# Patient Record
Sex: MALE | Employment: FULL TIME | URBAN - METROPOLITAN AREA
[De-identification: names, ages, dates, MRNs, and addresses within clinical notes are randomized per-mention and may not be internally consistent; named-entity substitution may affect disease eponyms.]

---

## 2023-02-04 PROBLEM — M54.50 CHRONIC LEFT-SIDED LOW BACK PAIN WITHOUT SCIATICA: Status: ACTIVE | Noted: 2023-02-04

## 2023-02-04 PROBLEM — G89.29 CHRONIC LEFT-SIDED LOW BACK PAIN WITHOUT SCIATICA: Status: ACTIVE | Noted: 2023-02-04

## 2023-02-14 ENCOUNTER — LAB ENCOUNTER (OUTPATIENT)
Dept: LAB | Age: 38
End: 2023-02-14
Attending: NURSE PRACTITIONER
Payer: COMMERCIAL

## 2023-02-14 DIAGNOSIS — R11.2 NAUSEA WITH VOMITING: ICD-10-CM

## 2023-02-14 DIAGNOSIS — R10.10 UPPER ABDOMINAL PAIN: Primary | ICD-10-CM

## 2023-02-14 PROCEDURE — 85025 COMPLETE CBC W/AUTO DIFF WBC: CPT | Performed by: NURSE PRACTITIONER

## 2023-02-14 PROCEDURE — 80053 COMPREHEN METABOLIC PANEL: CPT | Performed by: NURSE PRACTITIONER

## 2023-02-14 PROCEDURE — 36415 COLL VENOUS BLD VENIPUNCTURE: CPT | Performed by: NURSE PRACTITIONER

## 2023-02-14 PROCEDURE — 80061 LIPID PANEL: CPT | Performed by: NURSE PRACTITIONER

## 2023-02-14 PROCEDURE — 83013 H PYLORI (C-13) BREATH: CPT

## 2023-02-14 PROCEDURE — 83690 ASSAY OF LIPASE: CPT | Performed by: NURSE PRACTITIONER

## 2023-02-14 PROCEDURE — 84443 ASSAY THYROID STIM HORMONE: CPT | Performed by: NURSE PRACTITIONER

## 2023-02-15 LAB — H. PYLORI BREATH TEST: NEGATIVE

## 2023-02-22 ENCOUNTER — OFFICE VISIT (OUTPATIENT)
Dept: INTERNAL MEDICINE CLINIC | Facility: CLINIC | Age: 38
End: 2023-02-22
Payer: COMMERCIAL

## 2023-02-22 ENCOUNTER — TELEPHONE (OUTPATIENT)
Dept: INTERNAL MEDICINE CLINIC | Facility: CLINIC | Age: 38
End: 2023-02-22

## 2023-02-22 VITALS
OXYGEN SATURATION: 98 % | BODY MASS INDEX: 30.89 KG/M2 | SYSTOLIC BLOOD PRESSURE: 118 MMHG | HEIGHT: 69.5 IN | HEART RATE: 83 BPM | RESPIRATION RATE: 14 BRPM | TEMPERATURE: 99 F | DIASTOLIC BLOOD PRESSURE: 86 MMHG | WEIGHT: 213.38 LBS

## 2023-02-22 DIAGNOSIS — R11.2 NAUSEA AND VOMITING, UNSPECIFIED VOMITING TYPE: ICD-10-CM

## 2023-02-22 DIAGNOSIS — R19.7 DIARRHEA OF PRESUMED INFECTIOUS ORIGIN: ICD-10-CM

## 2023-02-22 DIAGNOSIS — R10.10 PAIN OF UPPER ABDOMEN: Primary | ICD-10-CM

## 2023-02-22 DIAGNOSIS — F41.8 SITUATIONAL ANXIETY: ICD-10-CM

## 2023-02-22 PROBLEM — F40.240 CLAUSTROPHOBIA: Status: ACTIVE | Noted: 2021-09-15

## 2023-02-22 PROBLEM — M51.36 LUMBAR DEGENERATIVE DISC DISEASE: Status: ACTIVE | Noted: 2021-10-12

## 2023-02-22 PROBLEM — M54.16 LUMBAR RADICULOPATHY, ACUTE: Status: ACTIVE | Noted: 2021-06-30

## 2023-02-22 PROCEDURE — 3074F SYST BP LT 130 MM HG: CPT | Performed by: NURSE PRACTITIONER

## 2023-02-22 PROCEDURE — 99214 OFFICE O/P EST MOD 30 MIN: CPT | Performed by: NURSE PRACTITIONER

## 2023-02-22 PROCEDURE — 3079F DIAST BP 80-89 MM HG: CPT | Performed by: NURSE PRACTITIONER

## 2023-02-22 PROCEDURE — 3008F BODY MASS INDEX DOCD: CPT | Performed by: NURSE PRACTITIONER

## 2023-02-22 RX ORDER — GABAPENTIN 300 MG/1
2 CAPSULE ORAL 3 TIMES DAILY
COMMUNITY
Start: 2021-10-15 | End: 2023-02-22 | Stop reason: ALTCHOICE

## 2023-02-22 RX ORDER — TRAMADOL HYDROCHLORIDE 50 MG/1
1 TABLET ORAL EVERY 6 HOURS PRN
COMMUNITY
Start: 2021-10-07 | End: 2023-02-22 | Stop reason: ALTCHOICE

## 2023-02-22 RX ORDER — LORAZEPAM 1 MG/1
1 TABLET ORAL 2 TIMES DAILY PRN
Qty: 2 TABLET | Refills: 0 | Status: SHIPPED | OUTPATIENT
Start: 2023-02-22

## 2023-02-22 NOTE — PATIENT INSTRUCTIONS
Get your stool sample sent in    Get your CT scan done    Make an apt with gastro doctor     Take the ativan 30 mins to an hour before your test. May repeat another tablet as needed. Monitor for side effects and effectiveness. Do not drive to your test.     Go to the ER for any emergent symptoms. If you cannot get there safely call 911.      Follow up in 1 month for your physical or sooner as needed

## 2023-02-22 NOTE — TELEPHONE ENCOUNTER
Pt stated his vomiting has gotten worse in the last 24 hours. Pt stated he has stop taking medication.  Please advise

## 2023-02-22 NOTE — TELEPHONE ENCOUNTER
Condition update:  Pt feels worse. Pt reports he started Omeprazole on Friday. Abdomen feels hard since Sunday. Started feeling nauseated all day Monday. Had an episode of diarrhea Tuesday, took Immodium. He vomited Tueday night after dinner. This morning he is  Afebrile,  Nauseated,  tolerating water and crackers only. Denies signs of dehydration. He did not take the Omeprazole this morning as he feels it is increasing his symptoms. Pt is requesting antinausea medication.

## 2023-02-22 NOTE — TELEPHONE ENCOUNTER
He needs to be evaluated. If he wants to be seen in office that is fine. If pain is severe or not tolerating water/food he should go to ER. Thanks.

## 2023-02-22 NOTE — TELEPHONE ENCOUNTER
Incoming (mail or fax):  fax  Received from:  [facility]  Documentation given to:  JOLIE STOUT [facility] records

## 2023-02-22 NOTE — TELEPHONE ENCOUNTER
Talked to pt and appt made for today     Future Appointments   Date Time Provider Abby An   2/22/2023  1:40 PM Alberto Mendoza, FLORA [facility] Kar Richard

## 2023-02-23 ENCOUNTER — TELEPHONE (OUTPATIENT)
Dept: INTERNAL MEDICINE CLINIC | Facility: CLINIC | Age: 38
End: 2023-02-23

## 2023-02-23 ENCOUNTER — LAB ENCOUNTER (OUTPATIENT)
Dept: LAB | Facility: HOSPITAL | Age: 38
End: 2023-02-23
Attending: NURSE PRACTITIONER
Payer: COMMERCIAL

## 2023-02-23 ENCOUNTER — HOSPITAL ENCOUNTER (OUTPATIENT)
Dept: CT IMAGING | Facility: HOSPITAL | Age: 38
Discharge: HOME | End: 2023-02-23
Attending: NURSE PRACTITIONER
Payer: COMMERCIAL

## 2023-02-23 DIAGNOSIS — R19.7 DIARRHEA, UNSPECIFIED TYPE: ICD-10-CM

## 2023-02-23 DIAGNOSIS — R19.7 DIARRHEA OF PRESUMED INFECTIOUS ORIGIN: Primary | ICD-10-CM

## 2023-02-23 DIAGNOSIS — R11.2 NAUSEA AND VOMITING, UNSPECIFIED VOMITING TYPE: Primary | ICD-10-CM

## 2023-02-23 DIAGNOSIS — R10.10 PAIN OF UPPER ABDOMEN: ICD-10-CM

## 2023-02-23 DIAGNOSIS — R10.819 ABDOMINAL TENDERNESS WITHOUT REBOUND TENDERNESS, UNSPECIFIED LOCATION: ICD-10-CM

## 2023-02-23 LAB
CRYPTOSP AG STL QL IA: NEGATIVE
G LAMBLIA AG STL QL IA: NEGATIVE

## 2023-02-23 PROCEDURE — 87329 GIARDIA AG IA: CPT

## 2023-02-23 PROCEDURE — 87045 FECES CULTURE AEROBIC BACT: CPT

## 2023-02-23 PROCEDURE — 74177 CT ABD & PELVIS W/CONTRAST: CPT | Performed by: NURSE PRACTITIONER

## 2023-02-23 PROCEDURE — 87272 CRYPTOSPORIDIUM AG IF: CPT

## 2023-02-23 PROCEDURE — 87046 STOOL CULTR AEROBIC BACT EA: CPT

## 2023-02-23 PROCEDURE — 87493 C DIFF AMPLIFIED PROBE: CPT

## 2023-02-23 PROCEDURE — 87427 SHIGA-LIKE TOXIN AG IA: CPT

## 2023-02-23 RX ORDER — FAMOTIDINE 20 MG/1
20 TABLET, FILM COATED ORAL 2 TIMES DAILY
Qty: 180 TABLET | Refills: 0 | Status: SHIPPED | OUTPATIENT
Start: 2023-02-23

## 2023-02-24 ENCOUNTER — TELEPHONE (OUTPATIENT)
Dept: INTERNAL MEDICINE CLINIC | Facility: CLINIC | Age: 38
End: 2023-02-24

## 2023-02-24 LAB — C DIFF TOX B STL QL: NEGATIVE

## 2023-03-01 ENCOUNTER — TELEPHONE (OUTPATIENT)
Dept: INTERNAL MEDICINE CLINIC | Facility: CLINIC | Age: 38
End: 2023-03-01

## 2023-03-01 NOTE — TELEPHONE ENCOUNTER
Care Everywhere Records Received    Updated Care Everywhere: yes    Date of Service: 3/1/23    From What Facility: [facility] - Dr Gracia Presser: gastroenterology    Type of Record: Consult    Document Placed:  Elizabeth's incoming folder

## 2023-03-08 ENCOUNTER — MED REC SCAN ONLY (OUTPATIENT)
Dept: INTERNAL MEDICINE CLINIC | Facility: CLINIC | Age: 38
End: 2023-03-08

## 2023-03-16 ENCOUNTER — TELEPHONE (OUTPATIENT)
Dept: INTERNAL MEDICINE CLINIC | Facility: CLINIC | Age: 38
End: 2023-03-16

## 2023-03-16 NOTE — TELEPHONE ENCOUNTER
Care Everywhere Records Received    Updated Care Everywhere: yes    Date of Service: 3/10/23    From What Facility: [facility]    Speciality: gastro    Type of Record: consult    Document Placed:marco newton

## 2023-03-17 PROBLEM — K29.50 MILD CHRONIC GASTRITIS: Status: ACTIVE | Noted: 2023-03-17

## 2023-03-17 PROBLEM — K63.5 COLON POLYPS: Status: ACTIVE | Noted: 2023-03-17

## 2023-04-03 ENCOUNTER — TELEPHONE (OUTPATIENT)
Dept: INTERNAL MEDICINE CLINIC | Facility: CLINIC | Age: 38
End: 2023-04-03

## 2023-04-03 RX ORDER — DIPHENOXYLATE HYDROCHLORIDE AND ATROPINE SULFATE 2.5; .025 MG/1; MG/1
2 TABLET ORAL 4 TIMES DAILY PRN
Qty: 30 TABLET | Refills: 0 | Status: SHIPPED | OUTPATIENT
Start: 2023-04-03 | End: 2023-04-14

## 2023-04-03 NOTE — TELEPHONE ENCOUNTER
Pt stated that he has a upper and lower endoscopy with Dr Jorden Gomez. Pt stated that Dr Jorden Gomez sent over a med list that he would like Elizabeth to take over (did not see that info anywhere) Pt is calling to get a refill for one of the meds. Pt is out of this med. Pt was advised that Gayla Mcint[place] will probably want an appt to be able to refill this med. Pt stated that he is out and wanted to see if Gayla Mc[place] could just refill it. Med is Dithemoxylate Atropine 2.5mg.    [facility] [address], [address] [facility]  [address], [phone], [phone]  Thank you

## 2023-04-04 NOTE — TELEPHONE ENCOUNTER
Patient aware prescription was filled.   He said he did not have time for a physical.  PSR told him he needed to schedule one in the next 1-2 months

## 2023-04-14 RX ORDER — DIPHENOXYLATE HYDROCHLORIDE AND ATROPINE SULFATE 2.5; .025 MG/1; MG/1
2 TABLET ORAL 4 TIMES DAILY PRN
Qty: 90 TABLET | Refills: 0 | Status: SHIPPED | OUTPATIENT
Start: 2023-04-14

## 2023-04-14 NOTE — TELEPHONE ENCOUNTER
Noted below. Please inform the patient that I did read the gastroenterology's note with the recommendation that is why I did not mind continuing it until I was able to see him. If I am prescribing it though instead of GI I would like him to come to the office to be evaluated so I know that it is working. The last time I saw him his symptoms were pretty intense-if I am not the prescriber it is my responsibility need to make sure he is doing better. I was going to do that at the physical so he does not have to come back twice. I refilled his medication so he will have plenty of time to make it into the office for his annual physical.  If he does not want to do his physical at this time and does not want to come in to see me for his medicine check then he may request this medication come from the gastroenterologist instead as an alternative if he would like. Thank you.

## 2023-04-14 NOTE — TELEPHONE ENCOUNTER
Pt called to follow up on rx. Pt states he really does not need a physical nor does he have time to come in for a physical. Pt states he followed up with gastro and they agreed that he needs this med.  Pt would like a new refill for more than 4 days

## 2023-04-14 NOTE — TELEPHONE ENCOUNTER
Pt called to request  Prescription refill for Lomotil.       Last OV relevant to medication:2/22/23  Last refill date:  4/3/23    #/refills: 30/0  When pt was asked to return for OV: May or June  Upcoming appt/reason: PE none scheduled  Was pt informed of any over due labs: none  Pt had OV with GI 3/1/23

## 2023-04-17 NOTE — TELEPHONE ENCOUNTER
Future Appointments   Date Time Provider Abby Nolan   6/17/2023  9:00 AM Meredith Mendoza, FLORA [facility] Britany Rodriguez

## 2023-05-17 RX ORDER — DIPHENOXYLATE HYDROCHLORIDE AND ATROPINE SULFATE 2.5; .025 MG/1; MG/1
2 TABLET ORAL 4 TIMES DAILY PRN
Qty: 180 TABLET | Refills: 0 | Status: SHIPPED | OUTPATIENT
Start: 2023-05-17

## 2023-05-17 NOTE — TELEPHONE ENCOUNTER
Last OV relevant to medication: 2/22/23  Last refill date: 4/14/23 #90/refills: 0  When pt was asked to return for OV: 3/22/23  Upcoming appt/reason:   Future Appointments   Date Time Provider Abby Nolan   6/17/2023  9:00 AM Bonnie Mednoza APRN [facility] N Jyothi Balderrama   Was pt informed of any over due labs: none

## 2023-05-17 NOTE — TELEPHONE ENCOUNTER
Is this medication prescribed by the [facility] Providers? yes    Did the patient contact the pharmacy directly?:  Yes said nothing happened    Is patient out of meds or supply very low?:  out    Medication Requested:  diphenoxylate-atropine    Dose:  2.5-0.025 mg    Is patient requesting a 30 or 90 day supply?:  30    Pharmacy name and phone # or location: [facility] Fer[facility] [address], [address] [place] [address] [address], [phone], [phone]     Is the patient due for an appointment?: yes  (if so, please schedule appt)    Additional Notes:  n/a    Please advise the patient refills take up to 72 business hours.

## 2023-06-17 ENCOUNTER — OFFICE VISIT (OUTPATIENT)
Dept: INTERNAL MEDICINE CLINIC | Facility: CLINIC | Age: 38
End: 2023-06-17
Payer: COMMERCIAL

## 2023-06-17 VITALS
SYSTOLIC BLOOD PRESSURE: 120 MMHG | WEIGHT: 202 LBS | BODY MASS INDEX: 29.25 KG/M2 | DIASTOLIC BLOOD PRESSURE: 80 MMHG | HEIGHT: 69.5 IN | OXYGEN SATURATION: 98 % | RESPIRATION RATE: 20 BRPM | TEMPERATURE: 98 F | HEART RATE: 103 BPM

## 2023-06-17 DIAGNOSIS — R19.7 DIARRHEA, UNSPECIFIED TYPE: ICD-10-CM

## 2023-06-17 DIAGNOSIS — Z13.29 SCREENING FOR THYROID DISORDER: ICD-10-CM

## 2023-06-17 DIAGNOSIS — R11.2 NAUSEA AND VOMITING, UNSPECIFIED VOMITING TYPE: ICD-10-CM

## 2023-06-17 DIAGNOSIS — K29.50 MILD CHRONIC GASTRITIS: ICD-10-CM

## 2023-06-17 DIAGNOSIS — Z13.220 SCREENING FOR CHOLESTEROL LEVEL: ICD-10-CM

## 2023-06-17 DIAGNOSIS — Z00.00 ENCOUNTER FOR ANNUAL PHYSICAL EXAM: Primary | ICD-10-CM

## 2023-06-17 PROCEDURE — 99395 PREV VISIT EST AGE 18-39: CPT | Performed by: NURSE PRACTITIONER

## 2023-06-17 PROCEDURE — 99214 OFFICE O/P EST MOD 30 MIN: CPT | Performed by: NURSE PRACTITIONER

## 2023-06-17 PROCEDURE — 3074F SYST BP LT 130 MM HG: CPT | Performed by: NURSE PRACTITIONER

## 2023-06-17 PROCEDURE — 3079F DIAST BP 80-89 MM HG: CPT | Performed by: NURSE PRACTITIONER

## 2023-06-17 PROCEDURE — 3008F BODY MASS INDEX DOCD: CPT | Performed by: NURSE PRACTITIONER

## 2023-06-17 RX ORDER — DIPHENOXYLATE HYDROCHLORIDE AND ATROPINE SULFATE 2.5; .025 MG/1; MG/1
2 TABLET ORAL 4 TIMES DAILY PRN
Qty: 180 TABLET | Refills: 0 | Status: SHIPPED | OUTPATIENT
Start: 2023-06-17

## 2023-06-17 RX ORDER — PANTOPRAZOLE SODIUM 40 MG/1
40 TABLET, DELAYED RELEASE ORAL DAILY PRN
Qty: 90 TABLET | Refills: 0 | Status: SHIPPED | OUTPATIENT
Start: 2023-06-17

## 2023-06-17 RX ORDER — DICYCLOMINE HCL 20 MG
20 TABLET ORAL
Qty: 90 TABLET | Refills: 1 | Status: SHIPPED | OUTPATIENT
Start: 2023-06-17

## 2023-06-17 NOTE — PATIENT INSTRUCTIONS
COVID recommended     Continue 2 bentyl in the morning- 40 mg is the maximum at one time. Continue the other medications    Get your labs done in February when due. You have to call for orders at that time because [facility] orders  after 6 months. You should be fasting for at least 10 hours. If you take a multivitamin with Biotin or any biotin product it should be held for 3 days prior to getting your labs done.      Follow up in 1 year or sooner as needed

## 2023-08-30 DIAGNOSIS — R11.2 NAUSEA AND VOMITING, UNSPECIFIED VOMITING TYPE: ICD-10-CM

## 2023-08-30 DIAGNOSIS — R19.7 DIARRHEA, UNSPECIFIED TYPE: ICD-10-CM

## 2023-08-30 RX ORDER — DICYCLOMINE HCL 20 MG
20 TABLET ORAL
Qty: 90 TABLET | Refills: 1 | Status: SHIPPED | OUTPATIENT
Start: 2023-08-30

## 2023-08-31 RX ORDER — DIPHENOXYLATE HYDROCHLORIDE AND ATROPINE SULFATE 2.5; .025 MG/1; MG/1
2 TABLET ORAL 4 TIMES DAILY PRN
Qty: 180 TABLET | Refills: 0 | Status: SHIPPED | OUTPATIENT
Start: 2023-08-31

## 2023-10-04 ENCOUNTER — TELEPHONE (OUTPATIENT)
Dept: INTERNAL MEDICINE CLINIC | Facility: CLINIC | Age: 38
End: 2023-10-04

## 2023-10-04 DIAGNOSIS — R19.7 DIARRHEA, UNSPECIFIED TYPE: ICD-10-CM

## 2023-10-04 DIAGNOSIS — R11.2 NAUSEA AND VOMITING, UNSPECIFIED VOMITING TYPE: ICD-10-CM

## 2023-10-04 NOTE — TELEPHONE ENCOUNTER
Patient called regarding a refill for dicyclomine 20 MG Oral Tab. [facility] [address] [facility], [address] [place] [address] Road, [phone], [phone] [[id number]]  is telling him there are no refills for this medication. Our office sent a 90 day supply with one refill to this [facility] on 8/30/23. Patient is quite frustrated and has been without the medication for 4 days. Would someone be able to call the pharmacy to see what's going on? Please advise. Thank you!

## 2023-10-04 NOTE — TELEPHONE ENCOUNTER
Spoke to eron, they do have refill on file and will have ready for pt today. Unsure why they told pt no refills. Spoke to pt, he understands this will be ready for  today. He may switch pharmacies in future , advised to call our office when running low as this is his last refill and we will send to desired pharmacy when needed and pt verbalized understanding.

## 2023-10-17 DIAGNOSIS — R11.2 NAUSEA AND VOMITING, UNSPECIFIED VOMITING TYPE: ICD-10-CM

## 2023-10-17 DIAGNOSIS — R19.7 DIARRHEA, UNSPECIFIED TYPE: ICD-10-CM

## 2023-10-17 RX ORDER — DIPHENOXYLATE HYDROCHLORIDE AND ATROPINE SULFATE 2.5; .025 MG/1; MG/1
2 TABLET ORAL 4 TIMES DAILY PRN
Qty: 180 TABLET | Refills: 1 | Status: SHIPPED | OUTPATIENT
Start: 2023-10-17

## 2023-10-17 RX ORDER — DICYCLOMINE HCL 20 MG
20 TABLET ORAL
Qty: 90 TABLET | Refills: 1 | Status: SHIPPED | OUTPATIENT
Start: 2023-10-17

## 2023-10-17 NOTE — TELEPHONE ENCOUNTER
Last OV relevant to medication: 6/17/23  Last refill date: 8/30/23 #90/refills: 1  When pt was asked to return for OV: 6/17/24  Upcoming appt/reason: No future appointments.   Was pt informed of any over due labs: amos

## 2023-10-17 NOTE — TELEPHONE ENCOUNTER
Is this medication prescribed by the [facility] Providers? yes    Did the patient contact the pharmacy directly?:  yes - pharmacy said office is not responding to faxes    Is patient out of meds or supply very low?:  out for 4 days    Medication Requested:  dicyclomine Oral Tab     Dose:  20 MG    Is patient requesting a 30 or 90 day supply?:  90    Pharmacy name and phone # or location:  [facility] [address] [place] [address] Road    Is the patient due for an appointment?: no  (if so, please schedule appt)    Additional Notes:  Patient talked at length to someone in the pharmacy about refills. The pharmacy told him they fax refills and will only refill a prescription if a refill shows in their records. Please advise the patient refills take up to 72 business hours.

## 2023-10-17 NOTE — TELEPHONE ENCOUNTER
Last OV relevant to medication: 6/17/23  Last refill date: 8/31/23 #180/refills: 0  When pt was asked to return for OV: 6/17/24  Upcoming appt/reason: No future appointments.   Was pt informed of any over due labs: amos

## 2024-01-09 DIAGNOSIS — R19.7 DIARRHEA, UNSPECIFIED TYPE: ICD-10-CM

## 2024-01-09 DIAGNOSIS — R11.2 NAUSEA AND VOMITING, UNSPECIFIED VOMITING TYPE: ICD-10-CM

## 2024-01-09 RX ORDER — DICYCLOMINE HCL 20 MG
20 TABLET ORAL
Qty: 90 TABLET | Refills: 1 | Status: SHIPPED | OUTPATIENT
Start: 2024-01-09

## 2024-01-09 NOTE — TELEPHONE ENCOUNTER
Last OV relevant to medication: 6/17/23  Last refill date: 10/17/23 #90/refills: 1  When pt was asked to return for OV: 6/17/24  Upcoming appt/reason: No future appointments.  Was pt informed of any over due labs: amos

## 2024-01-09 NOTE — TELEPHONE ENCOUNTER
diphenoxylate-atropine 2.5-0.025 MG Oral Tab     Did the patient contact the pharmacy directly?:  yes    Is patient out of meds or supply very low?:  low    Medication Requested:  dicyclomine 20 MG Oral Ta     diphenoxylate-atropine 2.5-0.025 MG Oral Tab     Dose:      Is patient requesting a 30 or 90 day supply?:  90 and 180    Pharmacy name and phone # or location:  [facility][id number] - [place], [place] - [address] [facility], [phone], [phone] [address] [facility] [address] Phone: [phone] Fax: [phone]     Is the patient due for an appointment?: no  (if so, please schedule appt)    Additional Notes:      Please advise the patient refills take up to 72 business hours.

## 2024-01-16 DIAGNOSIS — R19.7 DIARRHEA, UNSPECIFIED TYPE: Primary | ICD-10-CM

## 2024-01-16 RX ORDER — DIPHENOXYLATE HYDROCHLORIDE AND ATROPINE SULFATE 2.5; .025 MG/1; MG/1
2 TABLET ORAL 4 TIMES DAILY PRN
Qty: 180 TABLET | Refills: 2 | Status: SHIPPED | OUTPATIENT
Start: 2024-01-16

## 2024-01-16 NOTE — TELEPHONE ENCOUNTER
Pharmacy told patient they never received the prescription for diphenoxylate-atropine 2.5-0.025 MG Oral Tab that was sent on 10/17/23.  Patient has been out of medication for weeks and said he was sick from not taking the med.  Please resend.  Thank you!

## 2024-01-16 NOTE — TELEPHONE ENCOUNTER
Spoke to pharmacist, he picked up 30 day supply 10/18/23 and refill 12/10/23. Out of refills, have not been notified until today. Refill pended.    Last OV relevant to medication: 6/17/23  Last refill date: 10/17/23 #180/refills: 1  When pt was asked to return for OV: 6/17/24  Upcoming appt/reason: No future appointments.  Was pt informed of any over due labs: utd

## 2024-04-09 DIAGNOSIS — R11.2 NAUSEA AND VOMITING, UNSPECIFIED VOMITING TYPE: ICD-10-CM

## 2024-04-09 DIAGNOSIS — R19.7 DIARRHEA, UNSPECIFIED TYPE: ICD-10-CM

## 2024-04-09 RX ORDER — DICYCLOMINE HCL 20 MG
20 TABLET ORAL
Qty: 90 TABLET | Refills: 1 | Status: SHIPPED | OUTPATIENT
Start: 2024-04-09

## 2024-04-09 RX ORDER — DIPHENOXYLATE HYDROCHLORIDE AND ATROPINE SULFATE 2.5; .025 MG/1; MG/1
2 TABLET ORAL 4 TIMES DAILY PRN
Qty: 180 TABLET | Refills: 2 | Status: SHIPPED | OUTPATIENT
Start: 2024-04-09

## 2024-04-09 NOTE — TELEPHONE ENCOUNTER
Is this medication prescribed by the [facility] Providers? yes    Did the patient contact the pharmacy directly?:  yes - pharmacy said no refills    Is patient out of meds or supply very low?:  out    Medication Requested:  diphenoxylate-atropine 2.5-0.025 MG Oral Tab      dicyclomine 20 MG Oral Tab     Is patient requesting a 30 or 90 day supply?:  90    Pharmacy name and phone # or location:  [facility] #[id number] [address] & [facility], [phone], [phone] [[id number]]     Is the patient due for an appointment?: due for physical in June  (if so, please schedule appt)    Additional Notes:  none    Please advise the patient refills take up to 72 business hours.

## 2024-04-09 NOTE — TELEPHONE ENCOUNTER
Last OV relevant to medication: 6/17/23  Last refill date: 1/16/24 #180/2- prn med that's used 2 tabs qid   1/9/24 #90/1-prn med that's used tid  When pt was asked to return for OV: 6/17/24  Upcoming appt/reason: No future appointments.  Was pt informed of any over due labs: utd

## 2024-06-12 ENCOUNTER — OFFICE VISIT (OUTPATIENT)
Dept: INTERNAL MEDICINE CLINIC | Facility: CLINIC | Age: 39
End: 2024-06-12
Payer: COMMERCIAL

## 2024-06-12 VITALS
SYSTOLIC BLOOD PRESSURE: 126 MMHG | HEART RATE: 64 BPM | OXYGEN SATURATION: 99 % | TEMPERATURE: 98 F | BODY MASS INDEX: 27.41 KG/M2 | HEIGHT: 69.29 IN | WEIGHT: 187.19 LBS | RESPIRATION RATE: 16 BRPM | DIASTOLIC BLOOD PRESSURE: 84 MMHG

## 2024-06-12 DIAGNOSIS — R19.7 DIARRHEA, UNSPECIFIED TYPE: ICD-10-CM

## 2024-06-12 DIAGNOSIS — Z13.29 SCREENING FOR THYROID DISORDER: ICD-10-CM

## 2024-06-12 DIAGNOSIS — Z13.220 SCREENING FOR CHOLESTEROL LEVEL: ICD-10-CM

## 2024-06-12 DIAGNOSIS — R11.2 NAUSEA AND VOMITING, UNSPECIFIED VOMITING TYPE: Primary | ICD-10-CM

## 2024-06-12 DIAGNOSIS — F41.9 ANXIETY: ICD-10-CM

## 2024-06-12 DIAGNOSIS — Z00.00 PHYSICAL EXAM: ICD-10-CM

## 2024-06-12 PROCEDURE — 99215 OFFICE O/P EST HI 40 MIN: CPT | Performed by: NURSE PRACTITIONER

## 2024-06-12 PROCEDURE — 3079F DIAST BP 80-89 MM HG: CPT | Performed by: NURSE PRACTITIONER

## 2024-06-12 PROCEDURE — 3008F BODY MASS INDEX DOCD: CPT | Performed by: NURSE PRACTITIONER

## 2024-06-12 PROCEDURE — 3074F SYST BP LT 130 MM HG: CPT | Performed by: NURSE PRACTITIONER

## 2024-06-12 RX ORDER — DICYCLOMINE HCL 20 MG
20 TABLET ORAL
Qty: 90 TABLET | Refills: 1 | Status: SHIPPED | OUTPATIENT
Start: 2024-06-12

## 2024-06-12 RX ORDER — BUSPIRONE HYDROCHLORIDE 5 MG/1
5 TABLET ORAL 2 TIMES DAILY
Qty: 60 TABLET | Refills: 0 | Status: SHIPPED | OUTPATIENT
Start: 2024-06-12

## 2024-06-12 RX ORDER — DIPHENOXYLATE HYDROCHLORIDE AND ATROPINE SULFATE 2.5; .025 MG/1; MG/1
2 TABLET ORAL 4 TIMES DAILY PRN
Qty: 180 TABLET | Refills: 2 | Status: SHIPPED | OUTPATIENT
Start: 2024-06-12

## 2024-06-12 NOTE — PATIENT INSTRUCTIONS
Get the MRI of the brain done    Continue your other current medication    Start buspirone 5 mg twice daily for anxiety and monitor your side effects, effectiveness, and for allergy.    After 2 weeks if your anxiety is still not well-controlled but you have no side effects you may increase the dose to 10 mg twice daily.    Get your labs done. You should be fasting for at least 10 hours. If you take a multivitamin with Biotin or any biotin product it should be held for 3 days prior to getting your labs done.     Follow-up in 2 to 4 weeks for a medicine check and physical or sooner as needed

## 2024-06-12 NOTE — PROGRESS NOTES
Rodrigue Byrne is a 39 year old male.  CHIEF COMPLAINT   Multiple issues    HPI:     The patient has a history of  nausea, vomiting, diarrhea and has had multiple workups with gastroenterology.  He reports that his GI symptoms have worsened in the last few months.  Additionally if he does not take the Lomotil he has worse mood swings and abdominal symptoms.  He has started vomiting more.    Anxiety-not controlled.  Is interested in medication at this time.  No panic attacks but high level of anxiety and mood swings.  No depression, SI or HI.    Current Outpatient Medications   Medication Sig Dispense Refill    diphenoxylate-atropine 2.5-0.025 MG Oral Tab Take 2 tablets by mouth 4 (four) times daily as needed for Diarrhea. 180 tablet 2    dicyclomine 20 MG Oral Tab Take 1 tablet (20 mg total) by mouth 3 (three) times daily before meals as needed. 90 tablet 1      History reviewed. No pertinent past medical history.   Social History:  Social History     Socioeconomic History    Marital status: Single   Tobacco Use    Smoking status: Never    Smokeless tobacco: Never   Vaping Use    Vaping status: Never Used   Substance and Sexual Activity    Alcohol use: Yes     Comment: Occ    Drug use: Never    Sexual activity: Yes     Partners: Female   Other Topics Concern    Caffeine Concern No    Exercise Yes    Seat Belt Yes    Special Diet No    Stress Concern No    Weight Concern No        REVIEW OF SYSTEMS:   See HPI     EXAM:     /84 (BP Location: Left arm, Patient Position: Sitting, Cuff Size: adult)   Pulse 64   Temp 97.9 °F (36.6 °C) (Temporal)   Resp 16   Ht 5' 9.29\" (1.76 m)   Wt 187 lb 3.2 oz (84.9 kg)   SpO2 99%   BMI 27.41 kg/m²   Body mass index is 27.41 kg/m².   GENERAL: well developed, well nourished,in no apparent distress  HEENT: atraumatic, normocephalic  LUNGS: clear to auscultation; no rhonchi, rales, or wheezing  CARDIO: RRR without murmur  GI: Mid upper abdominal tenderness, BS  active  MUSCULOSKELETAL: Proximal normal gait.  EXTREMITIES: no edema  NEURO: Oriented times three, Cosmos-Hallpike negative  PSYCH: GAD7= 19, no depression, no SI or HI    LABS:      Lab Results   Component Value Date    WBC 5.4 02/14/2023    RBC 4.71 02/14/2023    HGB 14.6 02/14/2023    HCT 44.2 02/14/2023    MCV 93.8 02/14/2023    MCH 31.0 02/14/2023    MCHC 33.0 02/14/2023    RDW 12.4 02/14/2023    .0 02/14/2023      Lab Results   Component Value Date     (H) 02/14/2023    BUN 14 02/14/2023    CREATSERUM 0.92 02/14/2023    ANIONGAP 0 02/14/2023    CA 9.2 02/14/2023    OSMOCALC 283 02/14/2023    ALKPHO 68 02/14/2023    AST 13 (L) 02/14/2023    ALT 28 02/14/2023    BILT 0.6 02/14/2023    TP 7.4 02/14/2023    ALB 4.1 02/14/2023    GLOBULIN 3.3 02/14/2023     02/14/2023    K 4.0 02/14/2023     02/14/2023    CO2 29.0 02/14/2023      Lab Results   Component Value Date    CHOLEST 164 02/14/2023    TRIG 86 02/14/2023    HDL 72 (H) 02/14/2023    LDL 76 02/14/2023    VLDL 13 02/14/2023    NONHDLC 92 02/14/2023      Lab Results   Component Value Date    TSH 1.370 02/14/2023       ASSESSMENT AND PLAN:   1. Nausea and vomiting, unspecified vomiting type  2. Diarrhea, unspecified type  -Ongoing nausea, vomiting, diarrhea.  Is worsening.  -Continue current medication  -Get MRI of the brain to check for any cerebrovascular etiologies.  -Micaela-Hallpike was negative on exam and does not complain of vertigo symptoms such as spinning  -Go to the emergency room as needed for any emergent symptoms  -See gastroenterology for further follow-up  - MRI BRAIN (CPT=70551); Future  - diphenoxylate-atropine 2.5-0.025 MG Oral Tab; Take 2 tablets by mouth 4 (four) times daily as needed for Diarrhea.  Dispense: 180 tablet; Refill: 2  - dicyclomine 20 MG Oral Tab; Take 1 tablet (20 mg total) by mouth 3 (three) times daily before meals as needed.  Dispense: 90 tablet; Refill: 1    3. Anxiety  -Ongoing anxiety but is  increasing  -Start buspirone.  Monitor closely for side effects, effectiveness, allergy  - busPIRone 5 MG Oral Tab; Take 1 tablet (5 mg total) by mouth in the morning and 1 tablet (5 mg total) before bedtime.  Dispense: 60 tablet; Refill: 0    4. Physical exam  - CBC With Differential With Platelet; Future  - Comp Metabolic Panel (14); Future    5. Screening for cholesterol level  - Lipid Panel; Future    6. Screening for thyroid disorder  - TSH W Reflex To Free T4; Future     The patient indicates understanding of these issues and agrees to the plan.  The patient is asked to return in 2 to 4 weeks for a medicine check or sooner as needed.    Total face to face time was 40, more than 50% of the time was spent in counseling and/or coordination of care related to the above diagnosis .

## 2024-07-07 DIAGNOSIS — F41.9 ANXIETY: ICD-10-CM

## 2024-07-08 RX ORDER — BUSPIRONE HYDROCHLORIDE 5 MG/1
TABLET ORAL
Qty: 60 TABLET | Refills: 0 | Status: SHIPPED | OUTPATIENT
Start: 2024-07-08

## 2024-07-08 NOTE — TELEPHONE ENCOUNTER
Last OV relevant to medication: 6/12/24  Last refill date: started 6/12       When pt was asked to return for OV: 2-4 weeks  Upcoming appt/reason: anxiety  Was pt informed of any over due labs:   Aware to sched. appointment

## 2024-08-15 ENCOUNTER — TELEPHONE (OUTPATIENT)
Dept: INTERNAL MEDICINE CLINIC | Facility: CLINIC | Age: 39
End: 2024-08-15

## 2024-08-15 NOTE — TELEPHONE ENCOUNTER
If insurance will not let him pick it up he can see if he can do self pay and get it early that way.     He is also due to see me for a fu on the mental health medicine. Thanks.

## 2024-08-15 NOTE — TELEPHONE ENCOUNTER
Patient reports he will have to go without medication for 2 days per insurance coverage and is worried about missing it for those days. He says last time he didn't have it for 2 days he got very sick. He wants to know if there is something else he should take during that time?    Explained I would forward to Elizabeth and find out but also explained what was in medication tab that it was last prescribed for 90 tabs and 1 refill and asked why he is out of medication? He asked to speak w/Rianna or someone else that would better take care of his needs. No other nursing available at this time.

## 2024-08-15 NOTE — TELEPHONE ENCOUNTER
Is this medication prescribed by the [facility] Providers? yes    Did the patient contact the pharmacy directly?:  yes but they wont give it to him for two more days    Is patient out of meds or supply very low?:  completely out    Medication Requested:  dicyclomine     Dose:  20 mg tab    Is patient requesting a 30 or 90 day supply?:  30    Pharmacy name and phone # or location:  [id number] - [place] AT [address] [place] [address] & [address] [place] [address],     Is the patient due for an appointment?: no  (if so, please schedule appt)    Additional Notes:  patient is completely out of medication and is worried about not taking it for two days. Insurance wont approve the pickup until two days from now. Patient was wondering if there was something else he could take.    Please advise the patient refills take up to 72 business hours.

## 2024-08-16 NOTE — TELEPHONE ENCOUNTER
LM again for Rodrigue to call, we need to see about this Rx and set up these appointments and create communication limitations with staff.

## 2024-08-16 NOTE — TELEPHONE ENCOUNTER
Spoke to pharmacist- they said you can send an updated script for four times a day as needed which is maximum dose if patient is taking it more frequently and they can override with insurance, or he can pay out of pocket $70. Please advise, thanks!

## 2024-08-16 NOTE — TELEPHONE ENCOUNTER
Please change the script to QID as needed.     Erin, please call the pt and request that he fu with me for the anxiety, gets the MRI brain done and see GI for fu as well.

## 2024-08-20 NOTE — TELEPHONE ENCOUNTER
Left message to call back to schedule Anxiety follow up appointment.   Remind Pt to also do MRI brain and see GI

## 2024-08-29 ENCOUNTER — TELEPHONE (OUTPATIENT)
Dept: INTERNAL MEDICINE CLINIC | Facility: CLINIC | Age: 39
End: 2024-08-29

## 2024-08-29 NOTE — TELEPHONE ENCOUNTER
Long conversation with Rodrigue. We scheduled an appointment with Elizabeth Mcghee.9/7/24.  He never took the Buspirone. He was upset with conversation with RN and he felt he was not spoken to nicely. The Rx never got approved.   He did not ask for it to be re-ordered.   He sees GI from [facility]. Last appointment was last year, colonoscopy done. Has not seen recently. I told him should see about the persistent nausea and vomiting. He said last year they said he should see another sub-specialty.   MRI was approved by his insurance per his conversation with them. Then to find out there was going to be a substantial bill. Therefore, he cancelled the test. I did explain why the test was ordered.   Also there was a problem with ordering the Bentyl and the Diphenoxylate. He said the Bentyl helps with the stomach cramping, and the Diphenoxylate helps the nausea and vomiting. I told him he must see GI as Elizabeth instructed as we need to know why this is going on. He verbalized understanding. [facility].   He apologized for not getting back to us. I will sent him my chart message about [facility]. He is planning to move exclusively to [place]. Also, his insurance will soon change to [facility].

## 2024-09-04 ENCOUNTER — TELEPHONE (OUTPATIENT)
Dept: INTERNAL MEDICINE CLINIC | Facility: CLINIC | Age: 39
End: 2024-09-04

## 2024-09-04 NOTE — TELEPHONE ENCOUNTER
Reinaldo calling saying his pharmacy in [place] has been trying to reach us about medication refills. I promised I would call pharmacy.   I don't see any refill requests in the system.   Call to [facility]:   They said they \"faxed\" a request, and they received it back today.   Tech named Varsha confirms she received Rx for Diphenoxylate-atropine.   Reinaldo will be in to see Elizabeth 9/7/2024.

## 2024-09-04 NOTE — TELEPHONE ENCOUNTER
Patient is calling stating he wants to talk to Rianna or Erin and either would will know the exact reason for his call.